# Patient Record
Sex: MALE | Race: WHITE | HISPANIC OR LATINO | URBAN - METROPOLITAN AREA
[De-identification: names, ages, dates, MRNs, and addresses within clinical notes are randomized per-mention and may not be internally consistent; named-entity substitution may affect disease eponyms.]

---

## 2020-04-29 ENCOUNTER — EMERGENCY (EMERGENCY)
Facility: HOSPITAL | Age: 25
LOS: 1 days | Discharge: OTHER INSTITUTION | End: 2020-04-29
Attending: EMERGENCY MEDICINE
Payer: COMMERCIAL

## 2020-04-29 PROCEDURE — 99285 EMERGENCY DEPT VISIT HI MDM: CPT

## 2020-04-30 VITALS
DIASTOLIC BLOOD PRESSURE: 75 MMHG | TEMPERATURE: 98 F | HEIGHT: 71 IN | SYSTOLIC BLOOD PRESSURE: 122 MMHG | RESPIRATION RATE: 18 BRPM | WEIGHT: 190.04 LBS | OXYGEN SATURATION: 96 % | HEART RATE: 87 BPM

## 2020-04-30 VITALS
SYSTOLIC BLOOD PRESSURE: 113 MMHG | TEMPERATURE: 99 F | HEART RATE: 68 BPM | OXYGEN SATURATION: 98 % | RESPIRATION RATE: 18 BRPM | DIASTOLIC BLOOD PRESSURE: 78 MMHG

## 2020-04-30 DIAGNOSIS — F48.9 NONPSYCHOTIC MENTAL DISORDER, UNSPECIFIED: ICD-10-CM

## 2020-04-30 DIAGNOSIS — T50.902A POISONING BY UNSPECIFIED DRUGS, MEDICAMENTS AND BIOLOGICAL SUBSTANCES, INTENTIONAL SELF-HARM, INITIAL ENCOUNTER: ICD-10-CM

## 2020-04-30 DIAGNOSIS — F32.2 MAJOR DEPRESSIVE DISORDER, SINGLE EPISODE, SEVERE WITHOUT PSYCHOTIC FEATURES: ICD-10-CM

## 2020-04-30 LAB
ALBUMIN SERPL ELPH-MCNC: 4.6 G/DL — SIGNIFICANT CHANGE UP (ref 3.3–5.2)
ALP SERPL-CCNC: 83 U/L — SIGNIFICANT CHANGE UP (ref 40–120)
ALT FLD-CCNC: 22 U/L — SIGNIFICANT CHANGE UP
AMPHET UR-MCNC: NEGATIVE — SIGNIFICANT CHANGE UP
ANION GAP SERPL CALC-SCNC: 16 MMOL/L — SIGNIFICANT CHANGE UP (ref 5–17)
APAP SERPL-MCNC: <7.5 UG/ML — LOW (ref 10–26)
APPEARANCE UR: CLEAR — SIGNIFICANT CHANGE UP
AST SERPL-CCNC: 30 U/L — SIGNIFICANT CHANGE UP
BARBITURATES UR SCN-MCNC: NEGATIVE — SIGNIFICANT CHANGE UP
BASOPHILS # BLD AUTO: 0.04 K/UL — SIGNIFICANT CHANGE UP (ref 0–0.2)
BASOPHILS NFR BLD AUTO: 0.5 % — SIGNIFICANT CHANGE UP (ref 0–2)
BENZODIAZ UR-MCNC: NEGATIVE — SIGNIFICANT CHANGE UP
BILIRUB SERPL-MCNC: 1.1 MG/DL — SIGNIFICANT CHANGE UP (ref 0.4–2)
BILIRUB UR-MCNC: NEGATIVE — SIGNIFICANT CHANGE UP
BUN SERPL-MCNC: 14 MG/DL — SIGNIFICANT CHANGE UP (ref 8–20)
CALCIUM SERPL-MCNC: 9.6 MG/DL — SIGNIFICANT CHANGE UP (ref 8.6–10.2)
CHLORIDE SERPL-SCNC: 102 MMOL/L — SIGNIFICANT CHANGE UP (ref 98–107)
CO2 SERPL-SCNC: 24 MMOL/L — SIGNIFICANT CHANGE UP (ref 22–29)
COCAINE METAB.OTHER UR-MCNC: NEGATIVE — SIGNIFICANT CHANGE UP
COLOR SPEC: YELLOW — SIGNIFICANT CHANGE UP
CREAT SERPL-MCNC: 1.48 MG/DL — HIGH (ref 0.5–1.3)
DIFF PNL FLD: NEGATIVE — SIGNIFICANT CHANGE UP
EOSINOPHIL # BLD AUTO: 0.1 K/UL — SIGNIFICANT CHANGE UP (ref 0–0.5)
EOSINOPHIL NFR BLD AUTO: 1.4 % — SIGNIFICANT CHANGE UP (ref 0–6)
ETHANOL SERPL-MCNC: <10 MG/DL — SIGNIFICANT CHANGE UP
GLUCOSE SERPL-MCNC: 88 MG/DL — SIGNIFICANT CHANGE UP (ref 70–99)
GLUCOSE UR QL: NEGATIVE MG/DL — SIGNIFICANT CHANGE UP
HCT VFR BLD CALC: 52.8 % — HIGH (ref 39–50)
HGB BLD-MCNC: 17.9 G/DL — HIGH (ref 13–17)
IMM GRANULOCYTES NFR BLD AUTO: 0.1 % — SIGNIFICANT CHANGE UP (ref 0–1.5)
KETONES UR-MCNC: NEGATIVE — SIGNIFICANT CHANGE UP
LEUKOCYTE ESTERASE UR-ACNC: NEGATIVE — SIGNIFICANT CHANGE UP
LYMPHOCYTES # BLD AUTO: 1.35 K/UL — SIGNIFICANT CHANGE UP (ref 1–3.3)
LYMPHOCYTES # BLD AUTO: 18.4 % — SIGNIFICANT CHANGE UP (ref 13–44)
MAGNESIUM SERPL-MCNC: 2.2 MG/DL — SIGNIFICANT CHANGE UP (ref 1.6–2.6)
MCHC RBC-ENTMCNC: 29.1 PG — SIGNIFICANT CHANGE UP (ref 27–34)
MCHC RBC-ENTMCNC: 33.9 GM/DL — SIGNIFICANT CHANGE UP (ref 32–36)
MCV RBC AUTO: 85.7 FL — SIGNIFICANT CHANGE UP (ref 80–100)
METHADONE UR-MCNC: NEGATIVE — SIGNIFICANT CHANGE UP
MONOCYTES # BLD AUTO: 0.7 K/UL — SIGNIFICANT CHANGE UP (ref 0–0.9)
MONOCYTES NFR BLD AUTO: 9.5 % — SIGNIFICANT CHANGE UP (ref 2–14)
NEUTROPHILS # BLD AUTO: 5.15 K/UL — SIGNIFICANT CHANGE UP (ref 1.8–7.4)
NEUTROPHILS NFR BLD AUTO: 70.1 % — SIGNIFICANT CHANGE UP (ref 43–77)
NITRITE UR-MCNC: NEGATIVE — SIGNIFICANT CHANGE UP
OPIATES UR-MCNC: NEGATIVE — SIGNIFICANT CHANGE UP
PCP SPEC-MCNC: SIGNIFICANT CHANGE UP
PCP UR-MCNC: NEGATIVE — SIGNIFICANT CHANGE UP
PH UR: 6.5 — SIGNIFICANT CHANGE UP (ref 5–8)
PLATELET # BLD AUTO: 221 K/UL — SIGNIFICANT CHANGE UP (ref 150–400)
POTASSIUM SERPL-MCNC: 4.7 MMOL/L — SIGNIFICANT CHANGE UP (ref 3.5–5.3)
POTASSIUM SERPL-SCNC: 4.7 MMOL/L — SIGNIFICANT CHANGE UP (ref 3.5–5.3)
PROT SERPL-MCNC: 7.5 G/DL — SIGNIFICANT CHANGE UP (ref 6.6–8.7)
PROT UR-MCNC: NEGATIVE MG/DL — SIGNIFICANT CHANGE UP
RBC # BLD: 6.16 M/UL — HIGH (ref 4.2–5.8)
RBC # FLD: 11.8 % — SIGNIFICANT CHANGE UP (ref 10.3–14.5)
SALICYLATES SERPL-MCNC: <0.6 MG/DL — LOW (ref 10–20)
SODIUM SERPL-SCNC: 142 MMOL/L — SIGNIFICANT CHANGE UP (ref 135–145)
SP GR SPEC: 1.01 — SIGNIFICANT CHANGE UP (ref 1.01–1.02)
T4 AB SER-ACNC: 9.8 UG/DL — SIGNIFICANT CHANGE UP (ref 4.5–12)
THC UR QL: NEGATIVE — SIGNIFICANT CHANGE UP
TSH SERPL-MCNC: 1.56 UIU/ML — SIGNIFICANT CHANGE UP (ref 0.27–4.2)
UROBILINOGEN FLD QL: NEGATIVE MG/DL — SIGNIFICANT CHANGE UP
WBC # BLD: 7.35 K/UL — SIGNIFICANT CHANGE UP (ref 3.8–10.5)
WBC # FLD AUTO: 7.35 K/UL — SIGNIFICANT CHANGE UP (ref 3.8–10.5)

## 2020-04-30 PROCEDURE — 99285 EMERGENCY DEPT VISIT HI MDM: CPT

## 2020-04-30 PROCEDURE — 99285 EMERGENCY DEPT VISIT HI MDM: CPT | Mod: 25

## 2020-04-30 PROCEDURE — 85027 COMPLETE CBC AUTOMATED: CPT

## 2020-04-30 PROCEDURE — 83735 ASSAY OF MAGNESIUM: CPT

## 2020-04-30 PROCEDURE — 80307 DRUG TEST PRSMV CHEM ANLYZR: CPT

## 2020-04-30 PROCEDURE — 36415 COLL VENOUS BLD VENIPUNCTURE: CPT

## 2020-04-30 PROCEDURE — 93005 ELECTROCARDIOGRAM TRACING: CPT

## 2020-04-30 PROCEDURE — 93010 ELECTROCARDIOGRAM REPORT: CPT

## 2020-04-30 PROCEDURE — 84436 ASSAY OF TOTAL THYROXINE: CPT

## 2020-04-30 PROCEDURE — 84443 ASSAY THYROID STIM HORMONE: CPT

## 2020-04-30 PROCEDURE — 80053 COMPREHEN METABOLIC PANEL: CPT

## 2020-04-30 PROCEDURE — 81003 URINALYSIS AUTO W/O SCOPE: CPT

## 2020-04-30 NOTE — ED PROVIDER NOTE - CARE PLAN
Principal Discharge DX:	Suicide attempt by drug overdose  Secondary Diagnosis:	Severe major depression, single episode

## 2020-04-30 NOTE — ED ADULT NURSE NOTE - OBJECTIVE STATEMENT
Patient presents to ED BIBA for  suicide attempt. No PMH. Pt states he took approximately twenty  25 mg pills of Zquil at 22:30 tonight. Pt states  he is having issues with his relationship with his girlfriend. pt states he has had no known COVID contacts. Denies EtOH use, illicit drug use, HI, and hallucinations. Pt denies fevers/chills, ha, loc, focal neuro deficits, cp/sob/palp, cough, abd pain/n/v/d, urinary symptoms, recent travel and sick contacts.

## 2020-04-30 NOTE — ED BEHAVIORAL HEALTH ASSESSMENT NOTE - DESCRIPTION
medically cleared - uneventful  Vital Signs Last 24 Hrs  T(C): 36.7 (30 Apr 2020 07:14), Max: 36.9 (30 Apr 2020 00:00)  T(F): 98.1 (30 Apr 2020 07:14), Max: 98.5 (30 Apr 2020 00:00)  HR: 93 (30 Apr 2020 07:14) (64 - 93)  BP: 166/72 (30 Apr 2020 07:14) (122/75 - 166/72)  RR: 18 (30 Apr 2020 07:14) (18 - 18)  SpO2: 99% (30 Apr 2020 07:14) (96% - 99%) denies attend [facility] worked PT at [employer]; [employer]

## 2020-04-30 NOTE — ED PROVIDER NOTE - CLINICAL SUMMARY MEDICAL DECISION MAKING FREE TEXT BOX
25 y/o M pt BIBA with no PMHx presents to ED s/p suicide attempt at 22:30 tonight. 25 y/o M pt BIBA with no PMHx presents to ED s/p suicide attempt at 22:30 tonight. Pt observed for 4 hours as per tox since no signs of anticholinergic toxicity pt medically cleared for BH.

## 2020-04-30 NOTE — ED BEHAVIORAL HEALTH ASSESSMENT NOTE - SUICIDE RISK FACTORS
Current mood episode/Hopelessness or despair/Insomnia/Recent onset of current/past psychiatric diagnosis

## 2020-04-30 NOTE — ED PROVIDER NOTE - DISPOSITION TYPE
ASSUMED CARE OF PT THIS AM AROUND 0715- CARDIAC MONITOR IN PLACE AS ORDERED,
TRACING SB- UPON ASSESSMENT PT NOTED TO BE RESTING IN BED, WATCHING TV- PT A&O
X4- CONTINENT OF BOWEL AND BLADDER- UP AD-MICHAEL IN ROOM, STEADY GAIT NOTED-
LCTA, RESP EVEN AND UN-LABORED- VSS, O2 SAT 94% ON RA- ABD
SOFT/ROUND/NON-TENDER, BS X4 QUADS- LAST BM REPORTED THIS AM- IV NOTED TO LEFT
FA INTACT- GOOD PO INTAKE NOTED THIS AM WITH BREAKFAST- NIH NOTED AT 0 THIS
AM- PT STATES HEADACHE THIS AM, PRN HYDROCODONE NOTED TO HAVE BEEN GIVEN- CALL
LIGHT AND PERSONAL BELONGINGS WITH IN REACH- PT MAKES NEEDS KNOWN- ALLNEEDS
MET AT THIS TIME-WCTM AOU

## 2020-04-30 NOTE — ED BEHAVIORAL HEALTH ASSESSMENT NOTE - SUMMARY
24 yr old old male with depressive episode and premeditated suicide attempt by self poisoning with OTC cold preparation

## 2020-04-30 NOTE — ED BEHAVIORAL HEALTH ASSESSMENT NOTE - HPI (INCLUDE ILLNESS QUALITY, SEVERITY, DURATION, TIMING, CONTEXT, MODIFYING FACTORS, ASSOCIATED SIGNS AND SYMPTOMS)
brought in by EMS  after friend called 911 Patient describes conflicts in relationship causing emotional distress, shame guilt over last approx 2 wks Over last week felt progressively hopeless,  began to contemplate suicide  did apparently speak with friends who advised against this but he admits to disregarding advice to get help He had purchased Z-Quil to aid in sleeplessness Went to [facility] last night and took "about 10 capsules" believing this would be enough to kill him Contacted a friend ( refused to elaborate what he said) but friend called 911 and police responded  He expresses regret over his actions stating "it was foolish"  associated symptoms include sadness, self reproach, guilt over how he "messed up" a relationship[ of 2 yrs

## 2020-04-30 NOTE — ED ADULT NURSE REASSESSMENT NOTE - DESCRIPTION
received pt to  unit in yellow gown. waned by security for contraband.  pt is ao color good. pt stating I broke some girls heart.  and the way everyone ids portraying it. that's why I did it.  it was a mistake.  pt not elaborating what HAD OCCURRED.  ADMITS TO TAKING 6 PKG OF ZZQUIL TO HArm self.  denies pmh/pph. denies avh denies past s/a.  pt is currently not on any medication. 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  safety maintained oriented to unit and surrounds.  made aware of telepysch. plan of care.

## 2020-04-30 NOTE — ED PROVIDER NOTE - PROGRESS NOTE DETAILS
Pt's mother: Raciel #[phone] Spoke with Atif - tox fellow - states should observe for 4 hours after ingestion if no signs of anticholergenic toxicity - therefore pt medically cleared for BH

## 2020-04-30 NOTE — ED ADULT NURSE REASSESSMENT NOTE - NS ED NURSE REASSESS COMMENT FT1
Patient resting comfortably and calm on stretcher, PT AxO4, VSS, NSR on cardiac monitor. Pt appears in no distress at this time.  Safety measures taken, bed in low position, call bell within reach, side rails up x2. Plan of care explained. Pt verbalized understanding. Will continue to monitor.
Assumed care of patient at 0715. Patient laying in bed awake with no distress.  Oriented to staff and plan of care.  Safety of patient maintained.
Patient declined breakfast and is resting in bed sleeping intermittently.  Safety of patient maintained.

## 2020-04-30 NOTE — CHART NOTE - NSCHARTNOTEFT_GEN_A_CORE
CARLEE Note: Pt will be transferred to [facility] for inpt psychiatric care. 9.37 legals completed. pt aware. Ambulance arranged with [facility]. Called ins listed on face sheet, [employer] [phone]. Spoke with Linda. At this time they are waiving precerts and concurrent reviews. Activity ID # [id number]. Info forwarded to [facility] UR dept.

## 2020-04-30 NOTE — ED ADULT TRIAGE NOTE - CHIEF COMPLAINT QUOTE
"I took more then 10 packets of liquid gel zquils." +SI denies HI denies other drugs or alcohol today. pt states took medicine at 2230. pt denies pmhx  belongings removed and secured, pt placed in yellow gown pending MD Evaluation

## 2020-04-30 NOTE — ED ADULT NURSE REASSESSMENT NOTE - GENERAL PATIENT STATE
resting/sleeping
comfortable appearance/cooperative
cooperative
resting/sleeping/comfortable appearance

## 2020-04-30 NOTE — ED PROVIDER NOTE - OBJECTIVE STATEMENT
25 y/o M pt BIBA with no PMHx presents to ED s/p suicide attempt. Pt states he took approximately 20 25 mg pills of Zzzquil at 22:30 tonight. Pt states he is upset about his girlfriend and states he "didn't treat her right". Pt states he was talking to his friends and they were concerned about him and called the police. Pt states he has no hx of suicide attempts in the past. 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 He has had no known COVID contacts. Denies EtOH use, illicit drug use, HI, and hallucinations. Pt denies fevers/chills, ha, loc, focal neuro deficits, cp/sob/palp, cough, abd pain/n/v/d, urinary symptoms, recent travel and sick contacts.

## 2022-06-14 NOTE — ED ADULT NURSE REASSESSMENT NOTE - NSFALLRSKASSESSDT_ED_ALL_ED
30-Apr-2020 03:45 Detail Level: Detailed Number Of Freeze-Thaw Cycles: 2 freeze-thaw cycles Consent: The patient's consent was obtained including but not limited to risks of crusting, scabbing, blistering, scarring, darker or lighter pigmentary change, recurrence, incomplete removal and infection. Duration Of Freeze Thaw-Cycle (Seconds): 1 Render Note In Bullet Format When Appropriate: No Show Applicator Variable?: Yes Post-Care Instructions: I reviewed with the patient in detail post-care instructions. Patient is to wear sunprotection, and avoid picking at any of the treated lesions. Pt may apply Vaseline to crusted or scabbing areas.

## 2025-05-05 NOTE — ED BEHAVIORAL HEALTH ASSESSMENT NOTE - NS ED BHA PLAN LEGAL STATUS
Quality 130: Documentation Of Current Medications In The Medical Record: Current Medications Documented Quality 431: Preventive Care And Screening: Unhealthy Alcohol Use - Screening: Patient not identified as an unhealthy alcohol user when screened for unhealthy alcohol use using a systematic screening method Quality 440: Skin Cancer: Biopsy Reporting Time - Pathologist To Clinician: Pathologists/Dermatopathologists providing a second opinion on a biopsy Quality 226: Preventive Care And Screening: Tobacco Use: Screening And Cessation Intervention: Patient screened for tobacco use and is an ex/non-smoker Detail Level: Detailed Other